# Patient Record
Sex: MALE | Race: WHITE
[De-identification: names, ages, dates, MRNs, and addresses within clinical notes are randomized per-mention and may not be internally consistent; named-entity substitution may affect disease eponyms.]

---

## 2019-02-09 ENCOUNTER — HOSPITAL ENCOUNTER (OUTPATIENT)
Dept: HOSPITAL 42 - ED | Age: 64
Setting detail: OBSERVATION
LOS: 1 days | Discharge: HOME | End: 2019-02-10
Attending: INTERNAL MEDICINE | Admitting: INTERNAL MEDICINE
Payer: MEDICAID

## 2019-02-09 VITALS — BODY MASS INDEX: 21.9 KG/M2

## 2019-02-09 DIAGNOSIS — R07.89: Primary | ICD-10-CM

## 2019-02-09 DIAGNOSIS — I25.10: ICD-10-CM

## 2019-02-09 DIAGNOSIS — J45.909: ICD-10-CM

## 2019-02-09 DIAGNOSIS — E11.9: ICD-10-CM

## 2019-02-09 DIAGNOSIS — N40.0: ICD-10-CM

## 2019-02-09 DIAGNOSIS — I10: ICD-10-CM

## 2019-02-09 DIAGNOSIS — D50.9: ICD-10-CM

## 2019-02-09 LAB
ALBUMIN SERPL-MCNC: 4.6 G/DL (ref 3–4.8)
ALBUMIN/GLOB SERPL: 1.3 {RATIO} (ref 1.1–1.8)
ALT SERPL-CCNC: 10 U/L (ref 7–56)
APTT BLD: 32.8 SECONDS (ref 26.9–38.3)
AST SERPL-CCNC: 28 U/L (ref 17–59)
BUN SERPL-MCNC: 20 MG/DL (ref 7–21)
CALCIUM SERPL-MCNC: 9.5 MG/DL (ref 8.4–10.5)
ERYTHROCYTE [DISTWIDTH] IN BLOOD BY AUTOMATED COUNT: 15.5 % (ref 11.5–14.5)
GFR NON-AFRICAN AMERICAN: > 60
HGB BLD-MCNC: 13.4 G/DL (ref 14–18)
MCH RBC QN AUTO: 20.2 PG (ref 25–35)
MCHC RBC AUTO-ENTMCNC: 31.8 G/DL (ref 31–37)
MCV RBC AUTO: 63.3 FL (ref 80–105)
PLATELET # BLD: 151 10^3/UL (ref 120–450)
RBC # BLD AUTO: 6.65 10^6/UL (ref 3.5–6.1)
TROPONIN I SERPL-MCNC: < 0.01 NG/ML
WBC # BLD AUTO: 7.3 10^3/UL (ref 4.5–11)

## 2019-02-09 PROCEDURE — 99285 EMERGENCY DEPT VISIT HI MDM: CPT

## 2019-02-09 PROCEDURE — 83615 LACTATE (LD) (LDH) ENZYME: CPT

## 2019-02-09 PROCEDURE — 82550 ASSAY OF CK (CPK): CPT

## 2019-02-09 PROCEDURE — 82948 REAGENT STRIP/BLOOD GLUCOSE: CPT

## 2019-02-09 PROCEDURE — 85044 MANUAL RETICULOCYTE COUNT: CPT

## 2019-02-09 PROCEDURE — 85027 COMPLETE CBC AUTOMATED: CPT

## 2019-02-09 PROCEDURE — 80053 COMPREHEN METABOLIC PANEL: CPT

## 2019-02-09 PROCEDURE — 71045 X-RAY EXAM CHEST 1 VIEW: CPT

## 2019-02-09 PROCEDURE — 82728 ASSAY OF FERRITIN: CPT

## 2019-02-09 PROCEDURE — 84443 ASSAY THYROID STIM HORMONE: CPT

## 2019-02-09 PROCEDURE — 94640 AIRWAY INHALATION TREATMENT: CPT

## 2019-02-09 PROCEDURE — 83036 HEMOGLOBIN GLYCOSYLATED A1C: CPT

## 2019-02-09 PROCEDURE — 94760 N-INVAS EAR/PLS OXIMETRY 1: CPT

## 2019-02-09 PROCEDURE — 36415 COLL VENOUS BLD VENIPUNCTURE: CPT

## 2019-02-09 PROCEDURE — 83735 ASSAY OF MAGNESIUM: CPT

## 2019-02-09 PROCEDURE — 93005 ELECTROCARDIOGRAM TRACING: CPT

## 2019-02-09 PROCEDURE — 83550 IRON BINDING TEST: CPT

## 2019-02-09 PROCEDURE — 85007 BL SMEAR W/DIFF WBC COUNT: CPT

## 2019-02-09 PROCEDURE — 84100 ASSAY OF PHOSPHORUS: CPT

## 2019-02-09 PROCEDURE — 85730 THROMBOPLASTIN TIME PARTIAL: CPT

## 2019-02-09 PROCEDURE — 84484 ASSAY OF TROPONIN QUANT: CPT

## 2019-02-09 PROCEDURE — 83540 ASSAY OF IRON: CPT

## 2019-02-09 PROCEDURE — 85610 PROTHROMBIN TIME: CPT

## 2019-02-09 PROCEDURE — 80061 LIPID PANEL: CPT

## 2019-02-09 NOTE — ED PDOC
Arrival/HPI





- General


Chief Complaint: Chest Pain


Time Seen by Provider: 02/09/19 22:30


Historian: Patient





- History of Present Illness


Narrative History of Present Illness (Text): 


02/09/19 22:38


Ginette Pierce is a 63 year old male, whose past medical history includes 

hypertension and diabetes, who presents to the emergency department accompanied 

by relative complaining of chest pain. Patient states, as per relative acting as

, he has been experiencing intermittent chest pain since this morning.

Relative states patient recently arrived from [place]. Patient denies any fever, 

chills, shortness of breath, abdominal pain, nausea, vomiting, diarrhea, back 

pain, neck pain, headache, dizziness, or any other complaints. 


Symptom Onset: Gradual


Symptom Course: Unchanged


Activities at Onset: Light


Context: Home





Past Medical History





- Provider Review


Nursing Documentation Reviewed: Yes





- Infectious Disease


Hx of Infectious Diseases: None





- Cardiac


Hx Cardiac Disorders: Yes


Hx Hypertension: Yes





- Pulmonary


Hx Respiratory Disorders: No





- Neurological


Hx Neurological Disorder: No





- HEENT


Hx HEENT Disorder: No





- Renal


Hx Renal Disorder: No





- Endocrine/Metabolic


Hx Endocrine Disorders: Yes


Hx Diabetes Mellitus Type 2: Yes





- Hematological/Oncological


Hx Blood Disorders: No





- Integumentary


Hx Dermatological Disorder: No





- Musculoskeletal/Rheumatological


Hx Musculoskeletal Disorders: No





- Gastrointestinal


Hx Gastrointestinal Disorders: No





- Genitourinary/Gynecological


Hx Genitourinary Disorders: No





- Psychiatric


Hx Psychophysiologic Disorder: No


Hx Substance Use: No





Family/Social History





- Physician Review


Nursing Documentation Reviewed: Yes


Family/Social History: Unknown Family HX


Smoking Status: Never Smoked


Hx Alcohol Use: No


Hx Substance Use: No





Allergies/Home Meds


Allergies/Adverse Reactions: 


Allergies





No Known Allergies Allergy (Verified 02/09/19 22:29)


   








Home Medications: 


                                    Home Meds











 Medication  Instructions  Recorded  Confirmed


 


Aspirin [Adult Aspirin] 81 mg PO DAILY 02/10/19 02/10/19


 


Bisoprolol [Zebeta] 5 mg PO DAILY 02/10/19 02/10/19














Review of Systems





- Physician Review


All systems were reviewed & negative as marked: Yes





- Review of Systems


Constitutional: Normal.  absent: Fevers


Eyes: Normal


ENT: Normal


Respiratory: Normal.  absent: SOB, Cough


Cardiovascular: Chest Pain


Gastrointestinal: Normal.  absent: Abdominal Pain, Diarrhea, Nausea, Vomiting


Genitourinary Male: Normal.  absent: Dysuria, Frequency, Hematuria, Urinary 

Output Changes


Musculoskeletal: Normal.  absent: Back Pain, Neck Pain


Skin: Normal.  absent: Rash


Neurological: Normal.  absent: Headache, Dizziness


Endocrine: Normal


Hemo/Lymphatic: Normal


Psychiatric: Normal





Physical Exam


Vital Signs Reviewed: Yes





Vital Signs











  Temp Pulse Resp BP Pulse Ox


 


 02/09/19 22:34  98.3 F  69  12  134/77  100











Temperature: Afebrile


Blood Pressure: Normal


Pulse: Regular


Respiratory Rate: Normal


Appearance: Positive for: Well-Appearing, Non-Toxic, Comfortable


Pain Distress: None


Mental Status: Positive for: Alert and Oriented X 3





- Systems Exam


Head: Present: Atraumatic, Normocephalic


Pupils: Present: PERRL


Extroacular Muscles: Present: EOMI


Conjunctiva: Present: Normal


Mouth: Present: Moist Mucous Membranes


Neck: Present: Normal Range of Motion


Respiratory/Chest: Present: Clear to Auscultation, Good Air Exchange.  No: 

Respiratory Distress, Accessory Muscle Use


Cardiovascular: Present: Regular Rate and Rhythm, Normal S1, S2.  No: Murmurs


Abdomen: No: Tenderness, Distention, Peritoneal Signs


Back: Present: Normal Inspection


Upper Extremity: Present: Normal Inspection.  No: Cyanosis, Edema


Lower Extremity: Present: Normal Inspection.  No: Edema


Neurological: Present: GCS=15, CN II-XII Intact, Speech Normal


Skin: Present: Warm, Dry, Normal Color.  No: Rashes


Psychiatric: Present: Alert, Oriented x 3, Normal Insight, Normal Concentration





Medical Decision Making


ED Course and Treatment: 


02/09/19 22:38


Impression:


63 year old male complaining of chest pain since this morning. 





Plan:


-- EKG


-- CXR


-- Labs, cardiac enzymes


-- Aspirin


-- Reassess and disposition





Prior Visits:


Notes and results from previous visits were reviewed.





Progress Notes:


Reviewed EKG, NSR at 68 bpm. 1st degree AV block. LAD. RBBB. Inferior infarct. 

Non-specific ST/T wave changes.





02/10/19 00:25


CXR reviewed, shows no acute processes.





02/10/19 00:45


Case discussed with medical resident on call/and  who is aware and 

agrees with plan.








- EKG Interpretation


Interpreted by ED Physician: Yes


Type: 12 lead EKG





- Scribe Statement


The provider has reviewed the documentation as recorded by the Scribe


Guillermina Shola





Provider Scribe Attestation:


All medical record entries made by the Scribe were at my direction and 

personally dictated by me. I have reviewed the chart and agree that the record 

accurately reflects my personal performance of the history, physical exam, 

medical decision making, and the department course for this patient. I have also

 personally directed, reviewed, and agree with the discharge instructions and 

disposition.








Disposition/Present on Arrival





- Present on Arrival


Any Indicators Present on Arrival: No


History of DVT/PE: No


History of Uncontrolled Diabetes: No


Urinary Catheter: No


History of Decub. Ulcer: No


History Surgical Site Infection Following: None





- Disposition


Have Diagnosis and Disposition been Completed?: Yes


Diagnosis: 


 Chest pain





Disposition: HOSPITALIZED


Disposition Time: 00:46


Patient Plan: Observation


Patient Problems: 


                             Current Active Problems











Problem Status Onset


 


Chest pain Acute 











Condition: STABLE

## 2019-02-10 VITALS
DIASTOLIC BLOOD PRESSURE: 68 MMHG | SYSTOLIC BLOOD PRESSURE: 129 MMHG | TEMPERATURE: 98 F | OXYGEN SATURATION: 98 % | RESPIRATION RATE: 20 BRPM

## 2019-02-10 VITALS — HEART RATE: 72 BPM

## 2019-02-10 LAB
% IRON SATURATION: 21 % (ref 20–55)
ALBUMIN SERPL-MCNC: 4.5 G/DL (ref 3–4.8)
ALBUMIN/GLOB SERPL: 1.3 {RATIO} (ref 1.1–1.8)
ALT SERPL-CCNC: 13 U/L (ref 7–56)
ANISOCYTOSIS BLD QL SMEAR: (no result)
AST SERPL-CCNC: 23 U/L (ref 17–59)
BASO STIPL BLD QL SMEAR: (no result)
BUN SERPL-MCNC: 20 MG/DL (ref 7–21)
CALCIUM SERPL-MCNC: 9.4 MG/DL (ref 8.4–10.5)
EOSINOPHIL NFR BLD: 1 % (ref 0–3)
ERYTHROCYTE [DISTWIDTH] IN BLOOD BY AUTOMATED COUNT: 15.4 % (ref 11.5–14.5)
FERRITIN SERPL-MCNC: 152 NG/ML
GFR NON-AFRICAN AMERICAN: > 60
HDLC SERPL-MCNC: 31 MG/DL (ref 29–60)
HGB BLD-MCNC: 13.4 G/DL (ref 14–18)
HYPOCHROMIA BLD QL SMEAR: SLIGHT
INR PPP: 1.23
IRON SERPL-MCNC: 60 UG/DL (ref 45–180)
LDLC SERPL-MCNC: 110 MG/DL (ref 0–129)
LYMPHOCYTE: 29 % (ref 22–35)
MCH RBC QN AUTO: 20.1 PG (ref 25–35)
MCHC RBC AUTO-ENTMCNC: 32 G/DL (ref 31–37)
MCV RBC AUTO: 62.8 FL (ref 80–105)
MICROCYTES BLD QL SMEAR: SLIGHT
MONOCYTE: 7 % (ref 1–6)
NEUTROPHILS NFR BLD AUTO: 61 % (ref 50–70)
NEUTS BAND NFR BLD: 2 % (ref 0–2)
OVALOCYTES BLD QL SMEAR: SLIGHT
PLATELET # BLD EST: NORMAL 10*3/UL
PLATELET # BLD: 168 10^3/UL (ref 120–450)
PROTHROMBIN TIME: 13.6 SECONDS (ref 9.4–12.5)
RBC # BLD AUTO: 6.67 10^6/UL (ref 3.5–6.1)
TIBC SERPL-MCNC: 280 UG/DL (ref 261–462)
TROPONIN I SERPL-MCNC: < 0.01 NG/ML
WBC # BLD AUTO: 6.6 10^3/UL (ref 4.5–11)

## 2019-02-10 RX ADMIN — INSULIN LISPRO SCH UNIT: 100 INJECTION, SOLUTION INTRAVENOUS; SUBCUTANEOUS at 06:03

## 2019-02-10 RX ADMIN — INSULIN LISPRO SCH UNIT: 100 INJECTION, SOLUTION INTRAVENOUS; SUBCUTANEOUS at 12:59

## 2019-02-10 NOTE — CON
DATE OF CONSULTATION:  02/10/2019



REQUESTING PHYSICIAN:  Dr. Gonzalez.



REASON FOR CONSULTATION:  Chest pain.



HISTORY OF PRESENT ILLNESS:  This is a 63-year-old [place] man, who

recently immigrated from [place], who presents to the emergency room

complaining of chest discomfort, diaphoresis, and weakness.  He is seen in

the presence of his wife and daughter, who acted as .  According

to his daughter, he reportedly had a myocardial infarction some time back

in [place].  Full details are unavailable.  His pain is described as a sharp

sensation unrelated to activities.  Does have a history of hypertension and

diabetes.  He does not smoke.  There is no family history of premature

heart disease.  His cholesterol status is uncertain.



PAST MEDICAL HISTORY:  His past history is notable for the problems

mentioned above.  He reports he has BPH and iron deficiency anemia as well.



MEDICATIONS:  Medications at home reportedly are aspirin, bisoprolol, and

an oral hypoglycemic.



SOCIAL HISTORY:  He is , lives with his wife.  He does not smoke or

drink.



ALLERGIES:  NONE.



FAMILY HISTORY:  Unremarkable for premature heart disease.



REVIEW OF SYSTEMS:  Ten-point review of systems is otherwise unremarkable.



PHYSICAL EXAMINATION:

GENERAL:  He is a middle-aged man, who appears comfortable at rest.

VITAL SIGNS:  His blood pressure is 120/72 with a pulse of 60 and

respirations are 16.  He is afebrile.

HEENT:  Normocephalic, atraumatic.

NECK:  Supple.  No JVD noted.

CHEST:  Few scattered rhonchi are heard.

HEART:  PMI in normal position.  No pathological murmurs or gallops noted.

ABDOMEN:  Soft, nontender with normoactive bowel sounds.

EXTREMITIES:  No clubbing, cyanosis, or edema.

SKIN:  Warm and dry.

PSYCHIATRIC:  Normal mood and affect.

NEUROLOGICAL:  Alert and oriented x3.  No gross motor or sensory deficits

notable.



DIAGNOSTIC DATA:  Potassium 4.6, BUN and creatinine are 21 and 1, glucose

237.  CK is 34.  Troponin is negative.  White count 7.3, hemoglobin and

hematocrit are 13.4 and 42.1 with a platelet count of 151,000.  PT/INR are

13.6 and 32.8.  Chest x-ray reveals normal cardiac silhouette with clear

lung fields.  Electrocardiogram revealed sinus rhythm with nonspecific ST-T

abnormalities.



IMPRESSION:

1.  Chest pain, it sounds somewhat atypical by description.

2.  Possible history of heart disease with prior documentation unclear. 

Multiple cardiac risk factors given his diabetes and hypertension.



RECOMMENDATIONS:  Three sets cardiac enzyme should be checked.  If there is

no evidence of significant cardiac injury, discharge home with continuation

of his current medications as well as a statin would be advisable.  An

outpatient stress test can then be arranged.  If he has recurrent symptoms

or his cardiac enzymes become positive, a reevaluation will be necessary.



Thank you for this consultation.  We will be happy to see him as needed.







__________________________________________

Vince East MD





DD:  02/10/2019 6:46:55

DT:  02/10/2019 6:49:52

Job # [id number]

## 2019-02-10 NOTE — CP.PCM.HP
<Sukumar Damico - Last Filed: 02/10/19 02:19>





History of Present Illness





- History of Present Illness


History of Present Illness: 





Sukumar Damico, PGY1 H&P for Dr. Beltrán





cc: "chest pain"





Patient is a 63 year old [place]ian Speaking male with PMHx HTN, DM, MI (in [place]), 

BPH, Iron Deficiency Anemia who presented to the ED accompanied by family for 

chest pain for 1 day in duration. Patient's family members were available for 

translation. Patient has recently moved from [place] about 7 months ago. In the 

ED, Vitals: Temp 98.3, HR 69, RR 12, /77, SaO2 100% (room air). Medical 

team evaluated patient in the ED. He said that he has right sided chest pain 

that is sharp in quality. Denies radiation to the jaw, back, or upper 

extremities. He said the pain occurred while he was at rest, during work. His 

pain has improved since arriving to the ED. No recent decline in 

functional/activity tolerance. Denies orthopnea and lower extremity swelling. 

Sometimes he gets shortness of breath at night and has associated sweats. He 

also has associated cough for about 2 days in duration. However, he does not 

have any sputum production or phlegm. He said he used to get RUQ abdominal pain 

in the past but took pain medication to help relieve it; denies any abdominal 

pain at this time. Denies fever, chills, headache, shortness of breath, n/v/d, 

lightheadedness, dizziness, numbness/tingling of extremities, bloody stools. He 

does not have a PMD or cardiologist. Patient does not regularly follow up with a

physician. Denies having had any stress tests, cardiac caths or stent placement 

in the past. Patient is a poor historian and is not aware of all the medications

that he takes. 





A full 12 point ROS was conducted and unremarkable except as stated above.





PMD: none





PMHx: HTN, DM, MI (in [place]), BPH, Iron Deficiency Anemia


PSHx: denies


Meds: Bisoprolol, ASA 81mg daily, tablet for DM (does not know name)


Allergies: NKDA


FamHx: no history of premature CAD.


SocialHx: denies smoking, EtOH, and recreational drug use. Lives with family. 

Recently moved from [place] 7 months ago. 





Present on Admission





- Present on Admission


Any Indicators Present on Admission: No





Review of Systems





- Review of Systems


All systems: reviewed and no additional remarkable complaints except (as per 

HPI)





Past Patient History





- Infectious Disease


Hx of Infectious Diseases: None





- Past Social History


Smoking Status: Never Smoked





- CARDIAC


Hx Cardiac Disorders: Yes


Hx Hypertension: Yes





- PULMONARY


Hx Respiratory Disorders: No





- NEUROLOGICAL


Hx Neurological Disorder: No





- HEENT


Hx HEENT Problems: No





- RENAL


Hx Chronic Kidney Disease: No





- ENDOCRINE/METABOLIC


Hx Endocrine Disorders: Yes


Hx Diabetes Mellitus Type 2: Yes





- HEMATOLOGICAL/ONCOLOGICAL


Hx Blood Disorders: No





- INTEGUMENTARY


Hx Dermatological Problems: No





- MUSCULOSKELETAL/RHEUMATOLOGICAL


Hx Musculoskeletal Disorders: No





- GASTROINTESTINAL


Hx Gastrointestinal Disorders: No





- GENITOURINARY/GYNECOLOGICAL


Hx Genitourinary Disorders: No





- PSYCHIATRIC


Hx Psychophysiologic Disorder: No


Hx Substance Use: No





- SURGICAL HISTORY


Hx Surgeries: No





Meds


Allergies/Adverse Reactions: 


                                    Allergies











Allergy/AdvReac Type Severity Reaction Status Date / Time


 


No Known Allergies Allergy   Verified 02/09/19 22:29














Physical Exam





- Constitutional


Appears: No Acute Distress





- Head Exam


Head Exam: ATRAUMATIC, NORMAL INSPECTION, NORMOCEPHALIC





- Eye Exam


Eye Exam: EOMI, Normal appearance, PERRL


Pupil Exam: NORMAL ACCOMODATION





- ENT Exam


ENT Exam: Mucous Membranes Moist, Normal Exam





- Neck Exam


Neck exam: Positive for: Normal Inspection





- Respiratory Exam


Respiratory Exam: Clear to Auscultation Bilateral, NORMAL BREATHING PATTERN.  

absent: Accessory Muscle Use, Chest Wall Tenderness, Rales, Rhonchi, Wheezes, 

Stridor





- Cardiovascular Exam


Cardiovascular Exam: RRR, +S1, +S2





- GI/Abdominal Exam


GI & Abdominal Exam: Normal Bowel Sounds, Soft.  absent: Firm, Guarding, Hernia,

Rebound, Rigid, Tenderness





- Extremities Exam


Extremities exam: Positive for: full ROM, normal capillary refill, normal 

inspection, pedal pulses present.  Negative for: calf tenderness, pedal edema, 

tenderness





- Back Exam


Back exam: NORMAL INSPECTION.  absent: CVA tenderness (L), CVA tenderness (R), 

muscle spasm





- Neurological Exam


Neurological exam: Alert, CN II-XII Intact, Oriented x3





- Psychiatric Exam


Psychiatric exam: Normal Affect, Normal Mood





- Skin


Skin Exam: Dry, Intact, Normal Color, Warm





Results





- Vital Signs


Recent Vital Signs: 





                                Last Vital Signs











Temp  98.3 F   02/09/19 22:34


 


Pulse  69   02/09/19 22:34


 


Resp  12   02/09/19 22:34


 


BP  134/77   02/09/19 22:34


 


Pulse Ox  100   02/09/19 22:34














- Labs


Result Diagrams: 


                                 02/09/19 22:53





                                 02/09/19 22:53


Labs: 





                         Laboratory Results - last 24 hr











  02/09/19 02/09/19 02/09/19





  22:53 22:53 22:53


 


WBC    7.3


 


RBC    6.65 H


 


Hgb    13.4 L


 


Hct    42.1


 


MCV    63.3 L


 


MCH    20.2 L


 


MCHC    31.8


 


RDW    15.5 H


 


Plt Count    151


 


APTT  32.8  


 


Sodium   133 


 


Potassium   4.6 


 


Chloride   98 


 


Carbon Dioxide   26 


 


Anion Gap   13 


 


BUN   20 


 


Creatinine   1.0 


 


Est GFR ( Amer)   > 60 


 


Est GFR (Non-Af Amer)   > 60 


 


Random Glucose   237 H 


 


Calcium   9.5 


 


Total Bilirubin   1.0 


 


AST   28 


 


ALT   10 


 


Alkaline Phosphatase   85 


 


Lactate Dehydrogenase   427 


 


Total Creatine Kinase   34 L 


 


Troponin I   < 0.01 


 


Total Protein   8.0 


 


Albumin   4.6 


 


Globulin   3.4 


 


Albumin/Globulin Ratio   1.3 














Assessment & Plan





- Assessment and Plan (Free Text)


Assessment: 





Patient is a 63 year old [place]ian Speaking male with PMHx HTN, DM, MI (in [place]), 

BPH, Iron Deficiency Anemia who presented to the ED accompanied by family for 

chest pain for 1 day in duration. Patient will be admitted for atypical chest 

pain - r/o ACS. 


Plan: 





Atypical Chest Pain - r/o ACS


- ASA 81mg daily


- trend trops q6; initial trop negative x1


- robutussin prn for cough; may be due to previous URI and reason for chest pain




- Lipid panel


- TSH


- Hgb A1c


- Cardio consulted (Dr. Patricia)


- Echo


- CXR: no signs of active cardiopulmonary disease


- EKG: NSR at 68 bpm. 1st degree AV block. LAD. RBBB. Non-specific ST or T wave 

changes.


- Hx of MI in [place]  





Hx Iron Deficiency Anemia


- Iron studies ordered


- MCV 63 


- Considering low MCV and hx of night sweats with Bhutanese origin, order thick 

smear to r/o malaria 





Hx DM


- ISS (low)


- Accuchecks


- Glucose 237 in ED  





Hx HTN


- resume home med bisoprolol or alternative BP med if non-formulary 


- monitor BP 





GI ppx: ptx


DVT ppx: scd


Diet: NPO (except meds)





Dispo: monitor patient on remote telemetry. 





Case was discussed and reviewed with Attending Physician, Dr. Beltrán 











<Debora Beltrán - Last Filed: 02/10/19 20:16>





Results





- Vital Signs


Recent Vital Signs: 





                                Last Vital Signs











Temp  98 F   02/10/19 07:54


 


Pulse  72   02/10/19 14:00


 


Resp  20   02/10/19 07:54


 


BP  129/68   02/10/19 07:54


 


Pulse Ox  98   02/10/19 07:54














- Labs


Result Diagrams: 


                                 02/10/19 06:00





                                 02/10/19 06:00


Labs: 





                         Laboratory Results - last 24 hr











  02/09/19 02/09/19 02/09/19





  22:53 22:53 22:53


 


WBC    7.3


 


RBC    6.65 H


 


Hgb    13.4 L


 


Hct    42.1


 


MCV    63.3 L


 


MCH    20.2 L


 


MCHC    31.8


 


RDW    15.5 H


 


Plt Count    151


 


Neutrophils % (Manual)    61


 


Band Neutrophils %    2


 


Lymphocytes % (Manual)    29


 


Monocytes % (Manual)    7 H


 


Eosinophils % (Manual)    1


 


Platelet Evaluation    Normal


 


Hypochromasia    Slight


 


Basophilic Stippling    1+


 


Anisocytosis (manual)    1+


 


Microcytosis (manual)    Slight


 


Ovalocytes    Slight


 


Retic Count   


 


PT  13.6 H  


 


INR  1.23  


 


APTT  32.8  


 


Sodium   133 


 


Potassium   4.6 


 


Chloride   98 


 


Carbon Dioxide   26 


 


Anion Gap   13 


 


BUN   20 


 


Creatinine   1.0 


 


Est GFR ( Amer)   > 60 


 


Est GFR (Non-Af Amer)   > 60 


 


POC Glucose (mg/dL)   


 


Random Glucose   237 H 


 


Hemoglobin A1c   


 


Calcium   9.5 


 


Phosphorus   


 


Magnesium   


 


Iron   


 


TIBC   


 


% Saturation   


 


Ferritin   


 


Total Bilirubin   1.0 


 


AST   28 


 


ALT   10 


 


Alkaline Phosphatase   85 


 


Lactate Dehydrogenase   427 


 


Total Creatine Kinase   34 L 


 


Troponin I   < 0.01 


 


Total Protein   8.0 


 


Albumin   4.6 


 


Globulin   3.4 


 


Albumin/Globulin Ratio   1.3 


 


Triglycerides   


 


Cholesterol   


 


LDL Cholesterol Direct   


 


HDL Cholesterol   


 


TSH 3rd Generation   














  02/10/19 02/10/19 02/10/19





  01:40 05:44 06:00


 


WBC    6.6


 


RBC    6.67 H


 


Hgb    13.4 L


 


Hct    41.9 L


 


MCV    62.8 L


 


MCH    20.1 L


 


MCHC    32.0


 


RDW    15.4 H


 


Plt Count    168


 


Neutrophils % (Manual)   


 


Band Neutrophils %   


 


Lymphocytes % (Manual)   


 


Monocytes % (Manual)   


 


Eosinophils % (Manual)   


 


Platelet Evaluation   


 


Hypochromasia   


 


Basophilic Stippling   


 


Anisocytosis (manual)   


 


Microcytosis (manual)   


 


Ovalocytes   


 


Retic Count    1.88 H


 


PT   


 


INR   


 


APTT   


 


Sodium   


 


Potassium   


 


Chloride   


 


Carbon Dioxide   


 


Anion Gap   


 


BUN   


 


Creatinine   


 


Est GFR ( Amer)   


 


Est GFR (Non-Af Amer)   


 


POC Glucose (mg/dL)  180 H  196 H 


 


Random Glucose   


 


Hemoglobin A1c   


 


Calcium   


 


Phosphorus   


 


Magnesium   


 


Iron   


 


TIBC   


 


% Saturation   


 


Ferritin   


 


Total Bilirubin   


 


AST   


 


ALT   


 


Alkaline Phosphatase   


 


Lactate Dehydrogenase   


 


Total Creatine Kinase   


 


Troponin I   


 


Total Protein   


 


Albumin   


 


Globulin   


 


Albumin/Globulin Ratio   


 


Triglycerides   


 


Cholesterol   


 


LDL Cholesterol Direct   


 


HDL Cholesterol   


 


TSH 3rd Generation   














  02/10/19 02/10/19 02/10/19





  06:00 06:00 06:00


 


WBC   


 


RBC   


 


Hgb   


 


Hct   


 


MCV   


 


MCH   


 


MCHC   


 


RDW   


 


Plt Count   


 


Neutrophils % (Manual)   


 


Band Neutrophils %   


 


Lymphocytes % (Manual)   


 


Monocytes % (Manual)   


 


Eosinophils % (Manual)   


 


Platelet Evaluation   


 


Hypochromasia   


 


Basophilic Stippling   


 


Anisocytosis (manual)   


 


Microcytosis (manual)   


 


Ovalocytes   


 


Retic Count   


 


PT   


 


INR   


 


APTT   


 


Sodium  137  


 


Potassium  3.9  


 


Chloride  100  


 


Carbon Dioxide  28  


 


Anion Gap  13  


 


BUN  20  


 


Creatinine  1.0  


 


Est GFR ( Amer)  > 60  


 


Est GFR (Non-Af Amer)  > 60  


 


POC Glucose (mg/dL)   


 


Random Glucose  193 H  


 


Hemoglobin A1c    10.5 H


 


Calcium  9.4  


 


Phosphorus  4.0  


 


Magnesium  1.8  


 


Iron   


 


TIBC   


 


% Saturation   


 


Ferritin  152.0  


 


Total Bilirubin  1.1  


 


AST  23  


 


ALT  13  


 


Alkaline Phosphatase  89  


 


Lactate Dehydrogenase   


 


Total Creatine Kinase   


 


Troponin I  < 0.01  


 


Total Protein  8.0  


 


Albumin  4.5  


 


Globulin  3.5  


 


Albumin/Globulin Ratio  1.3  


 


Triglycerides  225 H  


 


Cholesterol  171  


 


LDL Cholesterol Direct  110  


 


HDL Cholesterol  31  


 


TSH 3rd Generation   2.11 














  02/10/19 02/10/19 02/10/19





  06:00 11:46 11:54


 


WBC   


 


RBC   


 


Hgb   


 


Hct   


 


MCV   


 


MCH   


 


MCHC   


 


RDW   


 


Plt Count   


 


Neutrophils % (Manual)   


 


Band Neutrophils %   


 


Lymphocytes % (Manual)   


 


Monocytes % (Manual)   


 


Eosinophils % (Manual)   


 


Platelet Evaluation   


 


Hypochromasia   


 


Basophilic Stippling   


 


Anisocytosis (manual)   


 


Microcytosis (manual)   


 


Ovalocytes   


 


Retic Count   


 


PT   


 


INR   


 


APTT   


 


Sodium   


 


Potassium   


 


Chloride   


 


Carbon Dioxide   


 


Anion Gap   


 


BUN   


 


Creatinine   


 


Est GFR ( Amer)   


 


Est GFR (Non-Af Amer)   


 


POC Glucose (mg/dL)    237 H


 


Random Glucose   


 


Hemoglobin A1c   


 


Calcium   


 


Phosphorus   


 


Magnesium   


 


Iron  60  


 


TIBC  280  


 


% Saturation  21  


 


Ferritin   


 


Total Bilirubin   


 


AST   


 


ALT   


 


Alkaline Phosphatase   


 


Lactate Dehydrogenase   


 


Total Creatine Kinase   


 


Troponin I   < 0.01 


 


Total Protein   


 


Albumin   


 


Globulin   


 


Albumin/Globulin Ratio   


 


Triglycerides   


 


Cholesterol   


 


LDL Cholesterol Direct   


 


HDL Cholesterol   


 


TSH 3rd Generation   














  02/10/19





  16:19


 


WBC 


 


RBC 


 


Hgb 


 


Hct 


 


MCV 


 


MCH 


 


MCHC 


 


RDW 


 


Plt Count 


 


Neutrophils % (Manual) 


 


Band Neutrophils % 


 


Lymphocytes % (Manual) 


 


Monocytes % (Manual) 


 


Eosinophils % (Manual) 


 


Platelet Evaluation 


 


Hypochromasia 


 


Basophilic Stippling 


 


Anisocytosis (manual) 


 


Microcytosis (manual) 


 


Ovalocytes 


 


Retic Count 


 


PT 


 


INR 


 


APTT 


 


Sodium 


 


Potassium 


 


Chloride 


 


Carbon Dioxide 


 


Anion Gap 


 


BUN 


 


Creatinine 


 


Est GFR ( Amer) 


 


Est GFR (Non-Af Amer) 


 


POC Glucose (mg/dL)  166 H


 


Random Glucose 


 


Hemoglobin A1c 


 


Calcium 


 


Phosphorus 


 


Magnesium 


 


Iron 


 


TIBC 


 


% Saturation 


 


Ferritin 


 


Total Bilirubin 


 


AST 


 


ALT 


 


Alkaline Phosphatase 


 


Lactate Dehydrogenase 


 


Total Creatine Kinase 


 


Troponin I 


 


Total Protein 


 


Albumin 


 


Globulin 


 


Albumin/Globulin Ratio 


 


Triglycerides 


 


Cholesterol 


 


LDL Cholesterol Direct 


 


HDL Cholesterol 


 


TSH 3rd Generation 














Attending/Attestation





- Attestation


I have personally seen and examined this patient.: Yes


I have fully participated in the care of the patient.: Yes


I have reviewed all pertinent clinical information: Yes

## 2019-02-10 NOTE — CP.PCM.DIS
Provider





- Provider


Date of Admission: 


02/10/19 00:44





Attending physician: 


Daina Gonzalez MD





Consults: 








02/10/19 01:32


Physician Consult Routine 


   Comment: 


   Consulting Provider: Jaswinder Patricia


   Consulting Physician: Jaswinder Patricia


   Reason for Consult: chest pain; cardiac risk factors











Time Spent in preparation of Discharge (in minutes): 45





Diagnosis





- Discharge Diagnosis


(1) Atypical chest pain


Status: Acute   





(2) CAD (coronary artery disease)


Status: Acute   





(3) DM2 (diabetes mellitus, type 2)


Status: Chronic   





(4) HTN (hypertension)


Status: Chronic   





(5) BPH (benign prostatic hyperplasia)


Status: Chronic   





Hospital Course





- Lab Results


Lab Results: 


                             Most Recent Lab Values











WBC  6.6 10^3/uL (4.5-11.0)   02/10/19  06:00    


 


RBC  6.67 10^6/uL (3.5-6.1)  H  02/10/19  06:00    


 


Hgb  13.4 g/dL (14.0-18.0)  L  02/10/19  06:00    


 


Hct  41.9 % (42.0-52.0)  L  02/10/19  06:00    


 


MCV  62.8 fl (80.0-105.0)  L  02/10/19  06:00    


 


MCH  20.1 pg (25.0-35.0)  L  02/10/19  06:00    


 


MCHC  32.0 g/dl (31.0-37.0)   02/10/19  06:00    


 


RDW  15.4 % (11.5-14.5)  H  02/10/19  06:00    


 


Plt Count  168 10^3/uL (120.0-450.0)   02/10/19  06:00    


 


Neutrophils % (Manual)  61 % (50.0-70.0)   02/09/19  22:53    


 


Band Neutrophils %  2 % (0-2)   02/09/19  22:53    


 


Lymphocytes % (Manual)  29 % (22.0-35.0)   02/09/19  22:53    


 


Monocytes % (Manual)  7 % (1.0-6.0)  H  02/09/19  22:53    


 


Eosinophils % (Manual)  1 % (0.0-3.0)   02/09/19  22:53    


 


Platelet Evaluation  Normal  (NORMAL)   02/09/19  22:53    


 


Hypochromasia  Slight   02/09/19  22:53    


 


Basophilic Stippling  1+   02/09/19  22:53    


 


Anisocytosis (manual)  1+   02/09/19  22:53    


 


Microcytosis (manual)  Slight   02/09/19  22:53    


 


Ovalocytes  Slight   02/09/19  22:53    


 


Retic Count  1.88 % (0.5-1.5)  H  02/10/19  06:00    


 


PT  13.6 SECONDS (9.4-12.5)  H  02/09/19  22:53    


 


INR  1.23   02/09/19  22:53    


 


APTT  32.8 Seconds (26.9-38.3)   02/09/19  22:53    


 


Sodium  137 mmol/L (132-148)   02/10/19  06:00    


 


Potassium  3.9 mmol/L (3.6-5.0)   02/10/19  06:00    


 


Chloride  100 mmol/L ()   02/10/19  06:00    


 


Carbon Dioxide  28 mmol/L (21-33)   02/10/19  06:00    


 


Anion Gap  13  (10-20)   02/10/19  06:00    


 


BUN  20 mg/dL (7-21)   02/10/19  06:00    


 


Creatinine  1.0 mg/dl (0.8-1.5)   02/10/19  06:00    


 


Est GFR ( Amer)  > 60   02/10/19  06:00    


 


Est GFR (Non-Af Amer)  > 60   02/10/19  06:00    


 


POC Glucose (mg/dL)  237 mg/dL ()  H  02/10/19  11:54    


 


Random Glucose  193 mg/dL ()  H  02/10/19  06:00    


 


Hemoglobin A1c  10.5 % (4.2-6.5)  H  02/10/19  06:00    


 


Calcium  9.4 mg/dL (8.4-10.5)   02/10/19  06:00    


 


Phosphorus  4.0 mg/dL (2.5-4.5)   02/10/19  06:00    


 


Magnesium  1.8 mg/dL (1.7-2.2)   02/10/19  06:00    


 


Iron  60 ug/dL ()   02/10/19  06:00    


 


TIBC  280 ug/dL (261-462)   02/10/19  06:00    


 


% Saturation  21 % (20-55)   02/10/19  06:00    


 


Ferritin  152.0 ng/mL  02/10/19  06:00    


 


Total Bilirubin  1.1 mg/dL (0.2-1.3)   02/10/19  06:00    


 


AST  23 U/L (17-59)   02/10/19  06:00    


 


ALT  13 U/L (7-56)   02/10/19  06:00    


 


Alkaline Phosphatase  89 U/L ()   02/10/19  06:00    


 


Lactate Dehydrogenase  427 U/L (333-699)   02/09/19  22:53    


 


Total Creatine Kinase  34 U/L ()  L  02/09/19  22:53    


 


Troponin I  < 0.01 ng/mL  02/10/19  11:46    


 


Total Protein  8.0 g/dL (5.8-8.3)   02/10/19  06:00    


 


Albumin  4.5 g/dL (3.0-4.8)   02/10/19  06:00    


 


Globulin  3.5 gm/dL  02/10/19  06:00    


 


Albumin/Globulin Ratio  1.3  (1.1-1.8)   02/10/19  06:00    


 


Triglycerides  225 mg/dL ()  H  02/10/19  06:00    


 


Cholesterol  171 mg/dL (130-200)   02/10/19  06:00    


 


LDL Cholesterol Direct  110 mg/dL (0-129)   02/10/19  06:00    


 


HDL Cholesterol  31 mg/dL (29-60)   02/10/19  06:00    


 


TSH 3rd Generation  2.11 mIU/mL (0.46-4.68)   02/10/19  06:00    














- Hospital Course


Hospital Course: 





63 year old [place] male with PMH of CAD (no hx of cath), DM2 (not taking 

prescribed metformin), HTN and BPH who was admitted for atypical chest pain that

he states is related to his diaphragm, that radiates to his back. He states that

he usually gets this pain before an asthma attack. The patient was diagnosed 

with CAD on an EKG in [place] and was started on the following 

medications:Bisoprolol 5/HCTZ 12.5mg, nitroglycerin 2.5mg, [id number], and 

Atorvastatin 10mg. Patient states that he has been compliant with these 

medications. However, he is also prescribed Metformin 850mg, which he is non-

compliant with because he doesn't feel his DM2 is uncontrolled. Patient is also 

on Finasteride and Tamsulosin for BPH. CXR was unremarkable. EKG showed sinus 

rhythm w/ 1st degree heart block, possible LA enlargement, LAD, RBBB. A1c was 

10.5 and . Cardiology was consulted, and Dr. East recommended d/c 

home w/ continued meds and outpatient stress test. Troponin was negative x3 with

no changes in EKG. The patient's symptoms improved, and he was requesting to be 

discharged. Echo was pending but patient stated that he would like to follow-up 

in clinic and do the Echocardiogram and stress test as outpatient. Discussion 

was had with the family regarding med rec and continuation of medications, 

sticking to low carb diet and starting metformin. Appointment will be to follow-

up in [facility] for close follow-up and cardiology follow-up. Upon discharge, the 

patient offers no complaints and his questions were answered. 





Discharge Exam





- Head Exam


Head Exam: ATRAUMATIC, NORMAL INSPECTION, NORMOCEPHALIC





- Eye Exam


Eye Exam: EOMI, Normal appearance, PERRL


Pupil Exam: NORMAL ACCOMODATION, PERRL





- ENT Exam


ENT Exam: Mucous Membranes Moist





- Neck Exam


Neck exam: Full Rom, Normal Inspection





- Respiratory Exam


Respiratory Exam: NORMAL BREATHING PATTERN, UNREMARKABLE.  absent: Rales, 

Rhonchi, Wheezes, Respiratory Distress





- Cardiovascular Exam


Cardiovascular Exam: RRR, +S1, +S2.  absent: JVD, Systolic Murmur





- GI/Abdominal Exam


GI & Abdominal Exam: Normal Bowel Sounds, Soft.  absent: Distended, Tenderness





- Extremities Exam


Extremities exam: full ROM, normal inspection





- Back Exam


Back exam: NORMAL INSPECTION.  absent: paraspinal tenderness





- Neurological Exam


Neurological exam: Alert, CN II-XII Intact, Normal Gait, Oriented x3, Reflexes 

Normal





- Psychiatric Exam


Psychiatric exam: Normal Affect, Normal Mood





- Skin


Skin Exam: Dry, Intact, Normal Color, Warm





Discharge Plan





- Discharge Medications


Prescriptions: 


Aspirin [Adult Aspirin] 81 mg PO DAILY #14 tablet.


Atorvastatin [Lipitor] 40 mg PO DIN #14 tab


Bisoprolol [Zebeta] 5 mg PO DAILY #14 tab


Finasteride [Proscar] 5 mg PO DAILY #14 tab


hydroCHLOROthiazide [Microzide] 12.5 mg PO DAILY #14 cap


MetFORMIN [glucoPHAGE] 1,000 mg PO DAILY #14 tab


Tamsulosin [Flomax] 0.4 mg PO DAILY #14 cap





- Follow Up Plan


Condition: STABLE


Disposition: HOME/ ROUTINE


Instructions:  Type 2 Diabetes, Metabolic Syndrome


Additional Instructions: 


Please follow-up with [facility] clinic appointment, they will call to set up an 

appointment with you


Please have Echocardiogram performed, script provided 


Please take your medications as scheduled


Please refrain from foods containing high carbs 


Referrals: 


[facility] [place]


Vince East MD [Staff Provider] -

## 2019-02-10 NOTE — CARD
--------------- APPROVED REPORT --------------





Date of service: 02/10/2019



EKG Measurement

Heart [id number]

[id number]

[id number]

[id number]

[id number]



<Conclusion>

Normal sinus rhythm

Left axis deviation

Right bundle branch block

Abnormal ECG

## 2019-02-10 NOTE — RAD
Date of service: 



02/09/2019



HISTORY:

 chest pain 



COMPARISON:

No prior. 



FINDINGS:



LUNGS:

No active pulmonary disease.



PLEURA:

No significant pleural effusion identified, no pneumothorax apparent.



CARDIOVASCULAR:

No aortic atherosclerotic calcification present.



Mild cardiomegaly no pulmonary vascular congestion. 



OSSEOUS STRUCTURES:

No significant abnormalities.



VISUALIZED UPPER ABDOMEN:

Normal.



OTHER FINDINGS:

None.



IMPRESSION:

No active disease.

## 2019-02-10 NOTE — CARD
--------------- APPROVED REPORT --------------





Date of service: 02/09/2019



EKG Measurement

Heart [id number]

[place] [id number]

[id number]

[id number]

[id number]



<Conclusion>

Sinus rhythm with 1st degree AV block

Possible Left atrial enlargement

Left axis deviation

Right bundle branch block

Inferior infarct, age undetermined

Abnormal ECG